# Patient Record
Sex: MALE | Race: WHITE | Employment: FULL TIME | URBAN - NONMETROPOLITAN AREA
[De-identification: names, ages, dates, MRNs, and addresses within clinical notes are randomized per-mention and may not be internally consistent; named-entity substitution may affect disease eponyms.]

---

## 2021-07-24 ENCOUNTER — HOSPITAL ENCOUNTER (EMERGENCY)
Age: 26
Discharge: HOME | End: 2021-07-24
Attending: EMERGENCY MEDICINE
Payer: COMMERCIAL

## 2021-07-24 ENCOUNTER — APPOINTMENT (OUTPATIENT)
Dept: GENERAL RADIOLOGY | Age: 26
End: 2021-07-24
Payer: COMMERCIAL

## 2021-07-24 VITALS
OXYGEN SATURATION: 96 % | BODY MASS INDEX: 25.77 KG/M2 | SYSTOLIC BLOOD PRESSURE: 120 MMHG | RESPIRATION RATE: 16 BRPM | HEIGHT: 70 IN | WEIGHT: 180 LBS | DIASTOLIC BLOOD PRESSURE: 74 MMHG | TEMPERATURE: 97.4 F | HEART RATE: 94 BPM

## 2021-07-24 DIAGNOSIS — S82.61XA CLOSED DISPLACED FRACTURE OF LATERAL MALLEOLUS OF RIGHT FIBULA, INITIAL ENCOUNTER: Primary | ICD-10-CM

## 2021-07-24 PROCEDURE — 29515 APPLICATION SHORT LEG SPLINT: CPT

## 2021-07-24 PROCEDURE — 99285 EMERGENCY DEPT VISIT HI MDM: CPT

## 2021-07-24 PROCEDURE — 6360000002 HC RX W HCPCS: Performed by: EMERGENCY MEDICINE

## 2021-07-24 PROCEDURE — 96372 THER/PROPH/DIAG INJ SC/IM: CPT

## 2021-07-24 PROCEDURE — 73610 X-RAY EXAM OF ANKLE: CPT

## 2021-07-24 RX ORDER — KETOROLAC TROMETHAMINE 30 MG/ML
30 INJECTION, SOLUTION INTRAMUSCULAR; INTRAVENOUS ONCE
Status: COMPLETED | OUTPATIENT
Start: 2021-07-24 | End: 2021-07-24

## 2021-07-24 RX ORDER — HYDROCODONE BITARTRATE AND ACETAMINOPHEN 5; 325 MG/1; MG/1
1 TABLET ORAL EVERY 6 HOURS PRN
Qty: 10 TABLET | Refills: 0 | Status: SHIPPED | OUTPATIENT
Start: 2021-07-24 | End: 2021-07-27

## 2021-07-24 RX ADMIN — KETOROLAC TROMETHAMINE 30 MG: 30 INJECTION, SOLUTION INTRAMUSCULAR at 05:03

## 2021-07-24 ASSESSMENT — PAIN DESCRIPTION - DESCRIPTORS: DESCRIPTORS: ACHING

## 2021-07-24 ASSESSMENT — PAIN DESCRIPTION - PAIN TYPE: TYPE: ACUTE PAIN

## 2021-07-24 ASSESSMENT — PAIN SCALES - GENERAL
PAINLEVEL_OUTOF10: 0
PAINLEVEL_OUTOF10: 5
PAINLEVEL_OUTOF10: 2
PAINLEVEL_OUTOF10: 0

## 2021-07-24 ASSESSMENT — PAIN DESCRIPTION - FREQUENCY: FREQUENCY: CONTINUOUS

## 2021-07-24 ASSESSMENT — PAIN DESCRIPTION - ORIENTATION: ORIENTATION: RIGHT

## 2021-07-24 ASSESSMENT — ENCOUNTER SYMPTOMS
SHORTNESS OF BREATH: 0
NAUSEA: 0
VOMITING: 0

## 2021-07-24 ASSESSMENT — PAIN DESCRIPTION - ONSET: ONSET: ON-GOING

## 2021-07-24 ASSESSMENT — PAIN DESCRIPTION - LOCATION: LOCATION: ANKLE

## 2021-07-24 NOTE — ED NOTES
Pt called ER saying he is outside and think he hurt his leg. Writer and Carlos (registration) found patient standing by the pharmacy doors. Pt states he walked here from [facility]. Pt states he tried getting out of bed of truck to get into cab when he hurt his right ankle. Pt states he heard a crack. Pt rates the pain 5/10.  Pt also states he has had \"a lot of alcohol tonight\"      Rahel Rockwell RN  07/24/21 210 Carmen Alexandra RN  07/24/21 1601

## 2021-07-24 NOTE — ED PROVIDER NOTES
[address] [facility] ED  [address]  Phone: [phone]  eMERGENCY dEPARTMENT eNCOUnter      Pt Name: Torin Matias  MRN: [id number]  Birthdate 1995  Date of evaluation: 7/24/21      CHIEF COMPLAINT     Chief Complaint   Patient presents with    Ankle Injury     right         HISTORY OF PRESENT ILLNESS    Torin Sheehan. Manuel Matias is a 31 y.o. male who presents today for evaluation of right ankle injury. Patient states that he was in the back of a pickup truck when he jumped out to get into the front of the vehicle and heard a crunch in his right ankle. He did not fall down or strike his head is not have any loss of consciousness no neck pain chest pain back pain or abdominal pain. Patient was able to ambulate here. Patient does admit to drinking alcohol this evening. Appears clinically intoxicated but oriented to person place time and situation. REVIEW OF SYSTEMS     Review of Systems   Constitutional: Negative for fever. HENT: Negative for congestion. Respiratory: Negative for shortness of breath. Cardiovascular: Negative for chest pain. Gastrointestinal: Negative for nausea and vomiting. Musculoskeletal: Positive for arthralgias. Skin: Negative for rash. Neurological: Negative for syncope, weakness and numbness. Psychiatric/Behavioral: Negative for confusion. All other systems reviewed and are negative. PAST MEDICAL HISTORY    has no past medical history on file. SURGICAL HISTORY      has no past surgical history on file. CURRENT MEDICATIONS       Discharge Medication List as of 7/24/2021  5:39 AM          ALLERGIES     has No Known Allergies. FAMILY HISTORY     has no family status information on file. family history is not on file. SOCIAL HISTORY      reports that he has been smoking cigarettes. He has been smoking about 1.00 pack per day.  He has never used smokeless tobacco. He reports that he does not drink alcohol and does not use drugs. PHYSICAL EXAM     INITIAL VITALS:  height is 5' 10\" (1.778 m) and weight is 180 lb (81.6 kg). His temperature is 97.4 °F (36.3 °C). His blood pressure is 120/74 and his pulse is 94. His respiration is 16 and oxygen saturation is 96%. Physical Exam  Vitals reviewed. Constitutional:       General: He is not in acute distress. Appearance: He is not ill-appearing. Comments: Clinically intoxicated but oriented to person place time and situation. HENT:      Head: Normocephalic and atraumatic. Mouth/Throat:      Mouth: Mucous membranes are moist.   Eyes:      Extraocular Movements: Extraocular movements intact. Pupils: Pupils are equal, round, and reactive to light. Neck:      Comments: No midline cervical spinal tenderness. Cardiovascular:      Rate and Rhythm: Normal rate and regular rhythm. Pulses: Normal pulses. Heart sounds: Normal heart sounds. Pulmonary:      Effort: Pulmonary effort is normal.      Breath sounds: Normal breath sounds. No wheezing. Abdominal:      Palpations: Abdomen is soft. Tenderness: There is no abdominal tenderness. There is no guarding or rebound. Musculoskeletal:         General: Swelling, tenderness, deformity and signs of injury present. Comments: Bilateral upper extremities unremarkable. Left lower extremity unremarkable. Right lower extremity no tenderness in the hip or femur knee proximal fibula or tibial plateau area. The ankle is swollen significantly over the medial lateral malleolus. There is tenderness to both. There is no tenderness over the base of the fifth metatarsal or the navicular. Skin is intact. Compartments are soft. 2+ dorsalis pedis pulse. Brisk capillary refill. Sensation is intact to the nerve distributions of the foot. Skin:     General: Skin is warm and dry. Capillary Refill: Capillary refill takes less than 2 seconds. Findings: No rash.    Neurological:      General: No focal deficit present. Mental Status: He is alert and oriented to person, place, and time. Cranial Nerves: No cranial nerve deficit. Sensory: No sensory deficit. Motor: No weakness. Psychiatric:         Mood and Affect: Mood normal.         Behavior: Behavior normal.       DIFFERENTIAL DIAGNOSIS / MDM / EMERGENCY DEPARTMENT COURSE:     Injuries appear to be isolated to the right ankle. X-ray does show fibular fracture possible with ligamentous injury. Patient placed in Ortho-Glass splint please see the procedure note below. Patient will be nonweightbearing. Case was discussed with orthopedic surgeon Dr. Julia Cartwright who see the patient in clinic. Patient educated on the warning signs which return to the emergency department. OARRS report was negative. Patient counseled on risks and benefits of opioid treatment including increased risk of abuse or dependency. Patient agreeable. Is to use NSAIDs for first-line pain control. As well as ice rest and elevation. I have reviewed the disposition diagnosis with the patient and or their family/guardian. I have answered their questions and givendischarge instructions. They voiced understanding of these instructions and did not have any further questions or complaints. DIAGNOSTIC RESULTS     EKG: All EKG's are interpreted by the Emergency Department Physician who either signs or Co-signs this chart inthe absence of a cardiologist.        RADIOLOGY:   I directly visualized the following plain film images and reviewed the radiologistinterpretations of radiologic studies:  XR ANKLE RIGHT (MIN 3 VIEWS)    Result Date: 7/24/2021  EXAMINATION: THREE XRAY VIEWS OF THE RIGHT ANKLE 7/24/2021 3:44 am COMPARISON: None.  HISTORY: ORDERING SYSTEM PROVIDED HISTORY: fall / deformity TECHNOLOGIST PROVIDED HISTORY: fall / deformity Reason for Exam: Fall, right ankle pain and swelling Acuity: Acute Type of Exam: Initial FINDINGS: There is mildly displaced distal fibula fracture at the level of the syndesmosis. Mild widening of the medial clear space is noted. No joint effusion. Soft tissue swelling is noted about the ankle. Displaced distal fibular fracture at the level of the syndesmosis. Mild widening of the medial clear space suggests ligamentous injury. Soft tissue swelling. LABS:  No results found for this visit on 07/24/21. EMERGENCY DEPARTMENT COURSE:   Vitals:    Vitals:    07/24/21 0318 07/24/21 0330 07/24/21 0544   BP: 139/88 132/77 120/74   Pulse: 104 100 94   Resp: 16 16 16   Temp: 97.4 °F (36.3 °C)     SpO2: 96% 96% 96%   Weight: 180 lb (81.6 kg)     Height: 5' 10\" (1.778 m)       -------------------------  BP: 120/74, Temp: 97.4 °F (36.3 °C), Pulse: 94, Resp: 16      CONSULTS:  Orthopedic    PROCEDURES:  Patient with distal fibular fracture and ligamentous injury. Indication for Ortho-Glass splint. I applied this by placing first a stockinette followed by web roll. 3 inch Ortho-Glass was applied with adequate positioning and immobilization. Pulse movement sensation intact before and after splinting. Patient is referred to orthopedics for definitive care. FINAL IMPRESSION      1. Closed displaced fracture of lateral malleolus of right fibula, initial encounter          DISPOSITION/PLAN   DISPOSITION Decision To Discharge 07/24/2021 05:34:58 AM      PATIENT REFERRED TO:  Beto Leong MD  [phone]    In 2 days        DISCHARGE MEDICATIONS:  Discharge Medication List as of 7/24/2021  5:39 AM      START taking these medications    Details   HYDROcodone-acetaminophen (NORCO) 5-325 MG per tablet Take 1 tablet by mouth every 6 hours as needed for Pain for up to 3 days. , Disp-10 tablet, R-0Print             Controlled Substances Monitoring:          The [facility]) report was reviewed on this patient by myself on this encounter.  The generated report indicated that the patient received:    No previous narcotics.     (Please note that portions of this note were completed with [employer] recognition program.  Efforts were made to edit the dictations but occasionally words are mis-transcribed.)    Praful Baltazar MD, [address] [facility]  Attending Emergency Medicine Physician        Praful Baltazar MD  07/24/21 3655

## 2021-07-27 ENCOUNTER — OFFICE VISIT (OUTPATIENT)
Dept: ORTHOPEDIC SURGERY | Age: 26
End: 2021-07-27
Payer: COMMERCIAL

## 2021-07-27 VITALS
HEART RATE: 85 BPM | SYSTOLIC BLOOD PRESSURE: 106 MMHG | DIASTOLIC BLOOD PRESSURE: 72 MMHG | BODY MASS INDEX: 25.77 KG/M2 | WEIGHT: 180 LBS | HEIGHT: 70 IN

## 2021-07-27 DIAGNOSIS — S93.431A ANKLE SYNDESMOSIS DISRUPTION, RIGHT, INITIAL ENCOUNTER: ICD-10-CM

## 2021-07-27 DIAGNOSIS — S82.61XA CLOSED FRACTURE OF DISTAL LATERAL MALLEOLUS OF ANKLE, RIGHT, INITIAL ENCOUNTER: ICD-10-CM

## 2021-07-27 DIAGNOSIS — S82.891A CLOSED FRACTURE OF RIGHT ANKLE, INITIAL ENCOUNTER: Primary | ICD-10-CM

## 2021-07-27 PROCEDURE — 27786 TREATMENT OF ANKLE FRACTURE: CPT | Performed by: NURSE PRACTITIONER

## 2021-07-27 PROCEDURE — 99213 OFFICE O/P EST LOW 20 MIN: CPT | Performed by: NURSE PRACTITIONER

## 2021-07-27 PROCEDURE — L4387 NON-PNEUM WALK BOOT PRE OTS: HCPCS | Performed by: NURSE PRACTITIONER

## 2021-07-27 RX ORDER — HYDROCODONE BITARTRATE AND ACETAMINOPHEN 5; 325 MG/1; MG/1
1-2 TABLET ORAL
Qty: 42 TABLET | Refills: 0 | Status: SHIPPED | OUTPATIENT
Start: 2021-07-27 | End: 2021-08-03

## 2021-07-27 NOTE — LETTER
[address] department of [place] [address]  Phone: [phone]  Fax: [phone], APRN - CNP        July 27, 2021     Patient: Wilbur Felty. D'Angelo   YOB: 1995   Date of Visit: 7/27/2021       To Whom It May Concern: It is my medical opinion that Wilbur Felty. Cristina Fought should remain out of work until 08/31/21. If you have any questions or concerns, please don't hesitate to call.     Sincerely,        Alexy Dawkins, APRN - CNP

## 2021-07-27 NOTE — PROGRESS NOTES
Orthopaedic Progress Note      CHIEF COMPLAINT: Right ankle fracture    HISTORY OF PRESENT ILLNESS:       Mr. Sirena Vitale  is a 31 y.o. male  who presents with a right ankle fracture. Patient states that he jumped off a bed of a truck this past Friday. He was noted to have significant pain and swelling to the right ankle after injury. He was unable to place any weight on his ankle secondary to pain. He did go to the hospital where he was noted to have a syndesmosis widening of the right ankle and a right distal fibula fracture. Patient was placed in a fiberglass splint sent to us today for initial orthopedic evaluation. He has been taking Norco for pain. Patient denies any numbness or tingling to the right foot. Patient has swelling and bruising to the right lower extremity. Past Medical History:    History reviewed. No pertinent past medical history. Past Surgical History:    History reviewed. No pertinent surgical history. Current  Medications:  Current Outpatient Medications   Medication Sig Dispense Refill    HYDROcodone-acetaminophen (NORCO) 5-325 MG per tablet Take 1-2 tablets by mouth every 4-6 hours as needed for Pain for up to 7 days. 42 tablet 0    HYDROcodone-acetaminophen (NORCO) 5-325 MG per tablet Take 1 tablet by mouth every 6 hours as needed for Pain for up to 3 days. 10 tablet 0     No current facility-administered medications for this visit. Allergies:  Shellfish-derived products    Social History:   Social History     Tobacco Use   Smoking Status Current Every Day Smoker    Packs/day: 1.00    Types: Cigarettes   Smokeless Tobacco Never Used     Social History     Substance and Sexual Activity   Alcohol Use No     Social History     Substance and Sexual Activity   Drug Use No       Family History:  History reviewed. No pertinent family history. REVIEW OF SYSTEMS:  Constitutional: Denies any fever, chills.   Musculoskeletal: Positive for right ankle pain and swelling. PHYSICAL EXAM:  [unfilled]  General appearance:  Alert and oriented x 3. No apparent distress, appears stated age, calm and cooperative. Musculoskeletal: Right lower extremity was examined. Skin is intact no rashes lesions or drainage. Significant swelling and bruising noted to the right ankle. Toe flexion and extension is intact. Denies calf pain to palpation. Patient does have pain to palpation over the medial and lateral ankle. Sensation intact neurovascularly intact to the right foot. Graylon Kiet DATA:  CBC: No results found for: WBC, HGB, PLT  BMP:  No results found for: NA, K, CL, CO2, BUN, CREATININE, CALCIUM, GLUCOSE  PT/INR:  No results found for: PROTIME, INR  Troponin:  No results found for: TROPONINI  No results for input(s): LIPASE, AMYLASE in the last 72 hours. No results for input(s): AST, ALT, BILIDIR, BILITOT, ALKPHOS in the last 72 hours. Uric Acid:  No components found for: URIC  Urine Culture:  No components found for: CURINE    Radiology:   XR ANKLE RIGHT (MIN 3 VIEWS)    Result Date: 7/24/2021  EXAMINATION: THREE XRAY VIEWS OF THE RIGHT ANKLE 7/24/2021 3:44 am COMPARISON: None. HISTORY: ORDERING SYSTEM PROVIDED HISTORY: fall / deformity TECHNOLOGIST PROVIDED HISTORY: fall / deformity Reason for Exam: Fall, right ankle pain and swelling Acuity: Acute Type of Exam: Initial FINDINGS: There is mildly displaced distal fibula fracture at the level of the syndesmosis. Mild widening of the medial clear space is noted. No joint effusion. Soft tissue swelling is noted about the ankle. Displaced distal fibular fracture at the level of the syndesmosis. Mild widening of the medial clear space suggests ligamentous injury. Soft tissue swelling. X-rays personally reviewed by me of 3 views of the right ankle does show a lateral malleolus ankle fracture with some displacement. Also syndesmosis widening of the right ankle. Diagnosis Orders   1.  Closed fracture of right ankle, initial encounter  Non-pneum walk boot pre ots    CT ANKLE RIGHT WO CONTRAST    CT ANKLE LEFT WO CONTRAST    HYDROcodone-acetaminophen (NORCO) 5-325 MG per tablet   2. Closed fracture of distal lateral malleolus of ankle, right, initial encounter     3. Ankle syndesmosis disruption, right, initial encounter           PLAN:  Plan at this time as discussed with Dr. La Rizzo. Dr. La Rizzo would like bilateral ankle CTs ordered for comparison. Would like to evaluate syndesmosis widening of the right ankle. We will get patient a walking boot. Nonweightbearing to the right lower extremity. We will send in a refill of Norco for pain. We will get him an off work note for 4 weeks. Will notify patient once CT results are obtained. Procedures    Non-pneum walk boot pre ots     Patient was prescribed a [employer]. The right  foot/ankle will require stabilization / immobilization from this semi-rigid / rigid orthosis to improve their function. The orthosis will assist in protecting the affected area, provide functional support and facilitate healing. The patient was educated and fit by a healthcare professional with expert knowledge and specialization in brace application while under the direct supervision of the physician. Verbal and written instructions for the use of and application of this item were provided. They were instructed to contact the office immediately should the brace result in increased pain, decreased sensation, increased swelling or worsening of the condition.         Procedure:        Electronically signed by LARRY Dugan CNP on 7/27/2021 at 12:01 PM

## 2021-08-04 ENCOUNTER — APPOINTMENT (OUTPATIENT)
Dept: CT IMAGING | Age: 26
End: 2021-08-04
Payer: COMMERCIAL

## 2021-08-04 ENCOUNTER — HOSPITAL ENCOUNTER (OUTPATIENT)
Dept: CT IMAGING | Age: 26
Discharge: HOME | End: 2021-08-06
Payer: COMMERCIAL

## 2021-08-04 DIAGNOSIS — S93.431A ANKLE SYNDESMOSIS DISRUPTION, RIGHT, INITIAL ENCOUNTER: Primary | ICD-10-CM

## 2021-08-04 DIAGNOSIS — S82.891A CLOSED FRACTURE OF RIGHT ANKLE, INITIAL ENCOUNTER: ICD-10-CM

## 2021-08-04 PROCEDURE — 73700 CT LOWER EXTREMITY W/O DYE: CPT

## 2021-08-10 ENCOUNTER — HOSPITAL ENCOUNTER (OUTPATIENT)
Dept: GENERAL RADIOLOGY | Age: 26
Discharge: HOME | End: 2021-08-12
Payer: COMMERCIAL

## 2021-08-10 ENCOUNTER — OFFICE VISIT (OUTPATIENT)
Dept: ORTHOPEDIC SURGERY | Age: 26
End: 2021-08-10
Payer: COMMERCIAL

## 2021-08-10 VITALS — BODY MASS INDEX: 25.77 KG/M2 | WEIGHT: 180 LBS | HEIGHT: 70 IN

## 2021-08-10 DIAGNOSIS — S82.891A CLOSED FRACTURE OF RIGHT ANKLE, INITIAL ENCOUNTER: Primary | ICD-10-CM

## 2021-08-10 DIAGNOSIS — S93.431A ANKLE SYNDESMOSIS DISRUPTION, RIGHT, INITIAL ENCOUNTER: ICD-10-CM

## 2021-08-10 PROCEDURE — 73610 X-RAY EXAM OF ANKLE: CPT

## 2021-08-10 PROCEDURE — 99024 POSTOP FOLLOW-UP VISIT: CPT | Performed by: PHYSICIAN ASSISTANT

## 2021-08-10 RX ORDER — HYDROCODONE BITARTRATE AND ACETAMINOPHEN 5; 325 MG/1; MG/1
1 TABLET ORAL EVERY 6 HOURS PRN
COMMUNITY

## 2021-08-10 NOTE — PROGRESS NOTES
Orthopaedic Progress Note      CHIEF COMPLAINT: Right ankle pain    HISTORY OF PRESENT ILLNESS:       Mr. Shoaib Carcamo  is a 31 y.o. male  who presents with a history of a right lateral ankle fracture that was treated nonoperatively. There were concerns about a possible syndesmotic injury. A CT scan was ordered. This was negative for syndesmotic disruption. He still in a boot been nonweightbearing he is here today for orthopedic evaluation. He currently denies numbness or tingling in his foot. Past Medical History:    No past medical history on file. Past Surgical History:    No past surgical history on file. Current  Medications:  Current Outpatient Medications   Medication Sig Dispense Refill    HYDROcodone-acetaminophen (NORCO) 5-325 MG per tablet Take 1 tablet by mouth every 6 hours as needed for Pain. No current facility-administered medications for this visit. Allergies:  Shellfish-derived products    Social History:   Social History     Tobacco Use   Smoking Status Current Every Day Smoker    Packs/day: 1.00    Types: Cigarettes   Smokeless Tobacco Never Used     Social History     Substance and Sexual Activity   Alcohol Use No     Social History     Substance and Sexual Activity   Drug Use No       Family History:  No family history on file. REVIEW OF SYSTEMS:  Constitutional: Denies any fever, chills. Musculoskeletal: Positive for right ankle fracture treated nonoperatively. PHYSICAL EXAM:  [unfilled]  General appearance:  Alert and oriented x 3. No apparent distress, appears stated age, calm and cooperative. Musculoskeletal: Right ankle is inspected does have moderate amount of swelling about the ankle. There is tenderness to palpation of the lateral malleolus. No tenderness of the medial malleolus. He has good toe flexion extension is neurovascular intact foot.       DATA:  CBC: No results found for: WBC, HGB, PLT  BMP:  No results found for: NA, K, CL, CO2, BUN, CREATININE, CALCIUM, GLUCOSE  PT/INR:  No results found for: PROTIME, INR  Troponin:  No results found for: TROPONINI  No results for input(s): LIPASE, AMYLASE in the last 72 hours. No results for input(s): AST, ALT, BILIDIR, BILITOT, ALKPHOS in the last 72 hours. Uric Acid:  No components found for: URIC  Urine Culture:  No components found for: CURINE    Radiology:   XR ANKLE RIGHT (MIN 3 VIEWS)    Result Date: 7/24/2021  EXAMINATION: THREE XRAY VIEWS OF THE RIGHT ANKLE 7/24/2021 3:44 am COMPARISON: None. HISTORY: ORDERING SYSTEM PROVIDED HISTORY: fall / deformity TECHNOLOGIST PROVIDED HISTORY: fall / deformity Reason for Exam: Fall, right ankle pain and swelling Acuity: Acute Type of Exam: Initial FINDINGS: There is mildly displaced distal fibula fracture at the level of the syndesmosis. Mild widening of the medial clear space is noted. No joint effusion. Soft tissue swelling is noted about the ankle. Displaced distal fibular fracture at the level of the syndesmosis. Mild widening of the medial clear space suggests ligamentous injury. Soft tissue swelling. CT ANKLE RIGHT WO CONTRAST    Result Date: 8/4/2021  EXAMINATION: CT OF THE RIGHT ANKLE WITHOUT CONTRAST, 8/4/2021 8:28 am TECHNIQUE: CT of the right ankle was performed without the administration of intravenous contrast.  Multiplanar reformatted images are provided for review. Dose modulation, iterative reconstruction, and/or weight based adjustment of the mA/kV was utilized to reduce the radiation dose to as low as reasonably achievable. COMPARISON: Right ankle plain radiographs from 07/24/2021 HISTORY ORDERING SYSTEM PROVIDED HISTORY: Closed fracture of right ankle, initial encounter TECHNOLOGIST PROVIDED HISTORY: Ankle/hindfoot: Reformats Hasty coronal reformats using axial image at the level of the tibial fibular syndesmosis. Adjust coronal reformats plane to bisect the tibia and fibula. Collimation 2 mm.  Hasty the sagittal reformats plane using the same image, peripendicular to the coronal reformats plane. Collimation is 2 mm. Reason for exam:->eval alignment of syndesmosis Reason for Exam: Eval right ankle fx Type of Exam: Subsequent/Follow-up 43-year-old male with closed fracture of the right ankle; evaluate right ankle fracture/follow-up. FINDINGS: Bones:  Obliquely oriented slightly laterally displaced fracture through the distal fibular metaphysis and diaphysis on image 32, series 306. This is also well seen on image 38, series 307 with up to 4 mm posterior displacement of the lateral malleolus fracture component. Tiny avulsion fracture along the anterior aspect of the lateral malleolus on image 56, series 2. Distal tibia and remaining visualized osseous structures appear intact. Os trigonum. No osteochondral lesion or defect at the talar dome. Remote well-corticated avulsion fracture fragment along the inferior aspect of the left lateral malleolus. Soft Tissue: Mild soft tissue swelling of the lateral and anterior ankle. Joint: Small tibiotalar joint effusion. 1. Obliquely oriented slightly laterally displaced fracture through the distal fibular diaphysis and metaphysis with soft tissue swelling of the lateral and anterior ankle. Tiny avulsion fracture along the anterior aspect of the lateral malleolus. 2. Small tibiotalar joint effusion. X-rays personally reviewed by me of 3 views right ankle reveal a nondisplaced lateral ankle fracture the mortise well maintained       Diagnosis Orders   1. Closed fracture of right ankle, initial encounter           PLAN:  Continue strict nonweightbearing. See his back in a month's time repeat x-rays 3 views right ankle if those look good we will consider getting him at the boot and get him walking    No orders of the defined types were placed in this encounter.        Procedure:        Electronically signed by Emmett Ingram PA-C on 8/10/2021 at 8:40 AM

## 2021-08-10 NOTE — LETTER
[address] department of [place] [address]  Phone: [phone]  Fax: [phone]    Shauna Diaz        August 10, 2021     Patient: Gina Davalos   YOB: 1995   Date of Visit: 8/10/2021       To Whom It May Concern: It is my medical opinion that Gina David should remain out of work until 9/7/21. If you have any questions or concerns, please don't hesitate to call.     Sincerely,        Chelsi Altamirano PA-C

## 2021-09-01 DIAGNOSIS — S82.61XA CLOSED FRACTURE OF DISTAL LATERAL MALLEOLUS OF ANKLE, RIGHT, INITIAL ENCOUNTER: Primary | ICD-10-CM

## 2021-09-07 ENCOUNTER — HOSPITAL ENCOUNTER (OUTPATIENT)
Dept: GENERAL RADIOLOGY | Age: 26
Discharge: HOME | End: 2021-09-09
Payer: COMMERCIAL

## 2021-09-07 ENCOUNTER — OFFICE VISIT (OUTPATIENT)
Dept: ORTHOPEDIC SURGERY | Age: 26
End: 2021-09-07
Payer: COMMERCIAL

## 2021-09-07 VITALS
BODY MASS INDEX: 25.77 KG/M2 | DIASTOLIC BLOOD PRESSURE: 80 MMHG | SYSTOLIC BLOOD PRESSURE: 118 MMHG | HEART RATE: 79 BPM | HEIGHT: 70 IN | WEIGHT: 180 LBS

## 2021-09-07 DIAGNOSIS — S82.64XD CLOSED NONDISPLACED FRACTURE OF LATERAL MALLEOLUS OF RIGHT FIBULA WITH ROUTINE HEALING, SUBSEQUENT ENCOUNTER: Primary | ICD-10-CM

## 2021-09-07 DIAGNOSIS — S82.61XA CLOSED FRACTURE OF DISTAL LATERAL MALLEOLUS OF ANKLE, RIGHT, INITIAL ENCOUNTER: ICD-10-CM

## 2021-09-07 PROCEDURE — 73610 X-RAY EXAM OF ANKLE: CPT

## 2021-09-07 PROCEDURE — 99024 POSTOP FOLLOW-UP VISIT: CPT | Performed by: PHYSICIAN ASSISTANT

## 2021-09-07 NOTE — PROGRESS NOTES
Orthopaedic Progress Note      CHIEF COMPLAINT: Right Ankle pain    HISTORY OF PRESENT ILLNESS:       Mr. Humberto Nguyễn  is a 31 y.o. male  who presents with a history of a right lateral malleolus ankle fracture with concerns with syndesmotic disruption. He has been doing minimal weightbearing. He is in a boot. He states the pain is improved significantly. He is about 6 weeks into this injury now he is here today for follow-up with new x-rays. Past Medical History:    No past medical history on file. Past Surgical History:    No past surgical history on file. Current  Medications:  Current Outpatient Medications   Medication Sig Dispense Refill    HYDROcodone-acetaminophen (NORCO) 5-325 MG per tablet Take 1 tablet by mouth every 6 hours as needed for Pain. No current facility-administered medications for this visit. Allergies:  Shellfish-derived products    Social History:   Social History     Tobacco Use   Smoking Status Current Every Day Smoker    Packs/day: 1.00    Types: Cigarettes   Smokeless Tobacco Never Used     Social History     Substance and Sexual Activity   Alcohol Use No     Social History     Substance and Sexual Activity   Drug Use No       Family History:  No family history on file. REVIEW OF SYSTEMS:  Constitutional: Denies any fever, chills. Musculoskeletal: Positive for right ankle fracture. PHYSICAL EXAM:  [unfilled]  General appearance:  Alert and oriented x 3. No apparent distress, appears stated age, calm and cooperative. Musculoskeletal: Right ankle was inspected skin is good repair there is no erythema increased warmth or cellulitis. Minimal tenderness. Good motion he is neurovascular intact foot.       DATA:  CBC: No results found for: WBC, HGB, PLT  BMP:  No results found for: NA, K, CL, CO2, BUN, CREATININE, CALCIUM, GLUCOSE  PT/INR:  No results found for: PROTIME, INR  Troponin:  No results found for: TROPONINI  No results for input(s): LIPASE, AMYLASE in the last 72 hours. No results for input(s): AST, ALT, BILIDIR, BILITOT, ALKPHOS in the last 72 hours. Uric Acid:  No components found for: URIC  Urine Culture:  No components found for: JOURDANINE    Radiology:   XR ANKLE RIGHT (MIN 3 VIEWS)    Result Date: 8/10/2021  EXAMINATION: THREE XRAY VIEWS OF THE RIGHT ANKLE 8/10/2021 8:18 am COMPARISON: Right ankle radiographs performed 07/24/2021. HISTORY: ORDERING SYSTEM PROVIDED HISTORY: Ankle syndesmosis disruption, right, initial encounter TECHNOLOGIST PROVIDED HISTORY: Reason for Exam: History of right ankle fracture Acuity: Acute Type of Exam: Subsequent/Follow-up FINDINGS: There is a nondisplaced fracture of the distal fibula. The ankle mortise and joint spaces of the hindfoot are maintained. There is soft tissue swelling. Nondisplaced fracture of the distal fibula. X-rays personally reviewed by me of 3 views right ankle reveal healing lateral mall ankle fracture the mortise is well-maintained       Diagnosis Orders   1. Closed nondisplaced fracture of lateral malleolus of right fibula with routine healing, subsequent encounter           PLAN:  Start weightbearing as tolerated in the boot. See his back in 1 month time preclinical x-rays 3 views right ankle. If those look good we will likely be done    No orders of the defined types were placed in this encounter.        Procedure:        Electronically signed by Karthik Naqvi PA-C on 9/7/2021 at 8:24 AM

## 2021-10-06 DIAGNOSIS — S82.64XD CLOSED NONDISPLACED FRACTURE OF LATERAL MALLEOLUS OF RIGHT FIBULA WITH ROUTINE HEALING, SUBSEQUENT ENCOUNTER: Primary | ICD-10-CM

## 2022-02-03 ENCOUNTER — HOSPITAL ENCOUNTER (EMERGENCY)
Age: 27
Discharge: HOME | End: 2022-02-03
Attending: EMERGENCY MEDICINE
Payer: COMMERCIAL

## 2022-02-03 VITALS
OXYGEN SATURATION: 96 % | DIASTOLIC BLOOD PRESSURE: 77 MMHG | TEMPERATURE: 102.6 F | SYSTOLIC BLOOD PRESSURE: 137 MMHG | HEART RATE: 118 BPM | RESPIRATION RATE: 16 BRPM

## 2022-02-03 DIAGNOSIS — J02.9 EXUDATIVE PHARYNGITIS: Primary | ICD-10-CM

## 2022-02-03 PROCEDURE — 99284 EMERGENCY DEPT VISIT MOD MDM: CPT

## 2022-02-03 PROCEDURE — 6370000000 HC RX 637 (ALT 250 FOR IP): Performed by: EMERGENCY MEDICINE

## 2022-02-03 PROCEDURE — 6360000002 HC RX W HCPCS: Performed by: EMERGENCY MEDICINE

## 2022-02-03 RX ORDER — AMOXICILLIN 500 MG/1
500 CAPSULE ORAL 3 TIMES DAILY
Qty: 30 CAPSULE | Refills: 0 | Status: SHIPPED | OUTPATIENT
Start: 2022-02-03 | End: 2022-02-13

## 2022-02-03 RX ORDER — ACETAMINOPHEN 500 MG
1000 TABLET ORAL ONCE
Status: COMPLETED | OUTPATIENT
Start: 2022-02-03 | End: 2022-02-03

## 2022-02-03 RX ORDER — DEXAMETHASONE SODIUM PHOSPHATE 10 MG/ML
10 INJECTION INTRAMUSCULAR; INTRAVENOUS ONCE
Status: COMPLETED | OUTPATIENT
Start: 2022-02-03 | End: 2022-02-03

## 2022-02-03 RX ORDER — IBUPROFEN 800 MG/1
800 TABLET ORAL EVERY 8 HOURS PRN
Qty: 30 TABLET | Refills: 0 | Status: SHIPPED | OUTPATIENT
Start: 2022-02-03

## 2022-02-03 RX ADMIN — ACETAMINOPHEN 1000 MG: 500 TABLET, FILM COATED ORAL at 14:11

## 2022-02-03 RX ADMIN — DEXAMETHASONE SODIUM PHOSPHATE 10 MG: 10 INJECTION INTRAMUSCULAR; INTRAVENOUS at 14:11

## 2022-02-03 ASSESSMENT — PAIN DESCRIPTION - LOCATION: LOCATION: THROAT

## 2022-02-03 ASSESSMENT — PAIN DESCRIPTION - ORIENTATION: ORIENTATION: POSTERIOR

## 2022-02-03 ASSESSMENT — ENCOUNTER SYMPTOMS
DIARRHEA: 0
NAUSEA: 1
SHORTNESS OF BREATH: 0
BACK PAIN: 0
CONSTIPATION: 0
TROUBLE SWALLOWING: 0
ABDOMINAL PAIN: 0
WHEEZING: 0
SORE THROAT: 1
VOMITING: 0

## 2022-02-03 ASSESSMENT — PAIN SCALES - GENERAL
PAINLEVEL_OUTOF10: 7
PAINLEVEL_OUTOF10: 7

## 2022-02-03 ASSESSMENT — PAIN DESCRIPTION - PROGRESSION: CLINICAL_PROGRESSION: GRADUALLY WORSENING

## 2022-02-03 ASSESSMENT — PAIN DESCRIPTION - FREQUENCY: FREQUENCY: CONTINUOUS

## 2022-02-03 ASSESSMENT — PAIN DESCRIPTION - DESCRIPTORS: DESCRIPTORS: SORE

## 2022-02-03 ASSESSMENT — PAIN DESCRIPTION - PAIN TYPE: TYPE: ACUTE PAIN

## 2022-02-03 ASSESSMENT — PAIN DESCRIPTION - ONSET: ONSET: ON-GOING

## 2022-02-03 NOTE — ED PROVIDER NOTES
[facility]  eMERGENCY dEPARTMENT eNCOUnter      Pt Name: Jyoti Pelayo  MRN: [id number]  Birthdate 1995  Date of evaluation: 2/3/2022      CHIEF COMPLAINT       Chief Complaint   Patient presents with    Otalgia     onset \"about 4 days ago\"    Pharyngitis    Fever         HISTORY OF PRESENT ILLNESS    Jyoti Pelayo is a 31 y.o. male who presents sore throat is been going on for several days started out as pain is years 4 days ago so she with a fever little bit of nausea no cough he has had some body aches      REVIEW OF SYSTEMS         Review of Systems   Constitutional: Positive for fatigue and fever. Negative for chills. HENT: Positive for ear pain and sore throat. Negative for congestion, dental problem and trouble swallowing. Eyes: Negative for visual disturbance. Respiratory: Negative for shortness of breath and wheezing. Cardiovascular: Negative for chest pain, palpitations and leg swelling. Gastrointestinal: Positive for nausea. Negative for abdominal pain, constipation, diarrhea and vomiting. Genitourinary: Negative for difficulty urinating, dysuria and testicular pain. Musculoskeletal: Negative for back pain, joint swelling and neck pain. Skin: Negative for rash. Neurological: Negative for dizziness, syncope, weakness and headaches. Hematological: Negative for adenopathy. Does not bruise/bleed easily. Psychiatric/Behavioral: Negative for confusion and suicidal ideas. PAST MEDICAL HISTORY    has no past medical history on file. SURGICAL HISTORY      has no past surgical history on file. CURRENT MEDICATIONS       Previous Medications    HYDROCODONE-ACETAMINOPHEN (NORCO) 5-325 MG PER TABLET    Take 1 tablet by mouth every 6 hours as needed for Pain. ALLERGIES     is allergic to shellfish-derived products. FAMILY HISTORY     has no family status information on file. family history is not on file.     SOCIAL HISTORY      reports that he has been smoking cigarettes. He has been smoking about 1.00 pack per day. He has never used smokeless tobacco. He reports that he does not drink alcohol and does not use drugs. PHYSICAL EXAM     INITIAL VITALS:  tympanic temperature is 102.6 °F (39.2 °C) (significant). His blood pressure is 137/77 and his pulse is 118. His respiration is 16 and oxygen saturation is 96%. Physical Exam  Constitutional:       General: He is not in acute distress. Appearance: He is well-developed. He is not ill-appearing, toxic-appearing or diaphoretic. Comments: Febrile   HENT:      Head: Normocephalic and atraumatic. Right Ear: Tympanic membrane and external ear normal.      Left Ear: Tympanic membrane and external ear normal.      Mouth/Throat:      Mouth: Mucous membranes are moist.      Pharynx: Oropharyngeal exudate and posterior oropharyngeal erythema present. Tonsils: Tonsillar exudate present. No tonsillar abscesses. 3+ on the right. 3+ on the left. Eyes:      Conjunctiva/sclera: Conjunctivae normal.      Pupils: Pupils are equal, round, and reactive to light. Cardiovascular:      Rate and Rhythm: Normal rate and regular rhythm. Pulmonary:      Effort: Pulmonary effort is normal.      Breath sounds: Normal breath sounds. Abdominal:      General: Bowel sounds are normal.      Palpations: Abdomen is soft. Musculoskeletal:         General: Normal range of motion. Cervical back: Normal range of motion and neck supple. Skin:     General: Skin is warm and dry. Neurological:      Mental Status: He is alert and oriented to person, place, and time.    Psychiatric:         Behavior: Behavior normal.             DIFFERENTIAL DIAGNOSIS/ MDM:     Exudative pharyngitis no cough tonsillar enlargement anterior lymphadenopathy we will treat    DIAGNOSTIC RESULTS     EKG: All EKG's are interpreted by the Emergency Department Physician who either signs or Co-signs this chart in the absence of a cardiologist.        RADIOLOGY:   I directly visualized the following  images and reviewed the radiologist interpretations:          ED BEDSIDE ULTRASOUND:       LABS:  Labs Reviewed - No data to display        EMERGENCY DEPARTMENT COURSE:   Vitals:    Vitals:    02/03/22 1353   BP: 137/77   Pulse: 118   Resp: 16   Temp: (S) 102.6 °F (39.2 °C)   TempSrc: Tympanic   SpO2: 96%     -------------------------  BP: 137/77, Temp: (S) 102.6 °F (39.2 °C) (\"took 800mg Ibuprofen about 1100\"), Pulse: 118, Resp: 16        Re-evaluation Notes        CRITICAL CARE:   None        CONSULTS:      PROCEDURES:  None    FINAL IMPRESSION      1. Exudative pharyngitis          DISPOSITION/PLAN   DISPOSITION Decision To Discharge    Condition on Disposition    Stable    PATIENT REFERRED TO:  No follow-up provider specified. DISCHARGE MEDICATIONS:  New Prescriptions    AMOXICILLIN (AMOXIL) 500 MG CAPSULE    Take 1 capsule by mouth 3 times daily for 10 days    IBUPROFEN (ADVIL;MOTRIN) 800 MG TABLET    Take 1 tablet by mouth every 8 hours as needed for Pain       (Please note that portions of this note were completed with a voice recognition program.  Efforts were made to edit the dictations but occasionally words are mis-transcribed.)    Ashli Dudley MD,, MD, F.A.A.E.M.   Attending Emergency Physician                          Ashli Dudley MD  02/03/22 0535

## 2022-02-03 NOTE — Clinical Note
Dayna Gusman Anais Olivo was seen and treated in our emergency department on 2/3/2022. He may return to work on 02/05/2022. If you have any questions or concerns, please don't hesitate to call.       Joseline Mehta MD